# Patient Record
Sex: FEMALE | Race: WHITE
[De-identification: names, ages, dates, MRNs, and addresses within clinical notes are randomized per-mention and may not be internally consistent; named-entity substitution may affect disease eponyms.]

---

## 2021-01-19 ENCOUNTER — HOSPITAL ENCOUNTER (EMERGENCY)
Dept: HOSPITAL 62 - ER | Age: 61
Discharge: HOME | End: 2021-01-19
Payer: SELF-PAY

## 2021-01-19 VITALS — SYSTOLIC BLOOD PRESSURE: 135 MMHG | DIASTOLIC BLOOD PRESSURE: 92 MMHG

## 2021-01-19 DIAGNOSIS — F17.200: ICD-10-CM

## 2021-01-19 DIAGNOSIS — R07.81: ICD-10-CM

## 2021-01-19 DIAGNOSIS — R07.89: Primary | ICD-10-CM

## 2021-01-19 LAB
BARBITURATES UR QL SCN: NEGATIVE
METHADONE UR QL SCN: NEGATIVE
PCP UR QL SCN: NEGATIVE
URINE BENZODIAZEPINES SCREEN: NEGATIVE
URINE COCAINE SCREEN: NEGATIVE
URINE MARIJUANA (THC) SCREEN: NEGATIVE

## 2021-01-19 PROCEDURE — 99284 EMERGENCY DEPT VISIT MOD MDM: CPT

## 2021-01-19 PROCEDURE — 80307 DRUG TEST PRSMV CHEM ANLYZR: CPT

## 2021-01-19 NOTE — ER DOCUMENT REPORT
HPI





- HPI


Patient complains to provider of: rib pain


Time Seen by Provider: 21 11:24


Onset: Yesterday


Onset/Duration: Persistent


Quality of pain: Achy


Pain Level: 5


Context: 





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  

Patient states that she pulled this muscle again yesterday pulling on a door.  

Patient states she has been doing a lot of exertional physical activities at 

work that been aggravating this rib pain.  Patient denies any cough or cold 

symptoms.  Patient denies any fever.  Patient denies any nausea vomiting or 

diarrhea.


Associated Symptoms: Other - Left lower anterior rib pain.  denies: 

Nonproductive cough, Productive cough, Fever


Exacerbated by: Movement, Deep breathing


Relieved by: Remaining still


Similar symptoms previously: Yes


Recently seen / treated by doctor: No





- ROS


ROS below otherwise negative: Yes


Systems Reviewed and Negative: Yes All other systems reviewed and negative





- CONSTITUTIONAL


Constitutional: DENIES: Fever, Chills





- CARDIOVASCULAR


Cardiovascular: REPORTS: Chest pain - Left lower rib pain





- RESPIRATORY


Respiratory: DENIES: Trouble Breathing, Coughing





- GASTROINTESTINAL


Gastrointestinal: DENIES: Abdominal Pain, Nausea, Patient vomiting





- URINARY


Urinary: DENIES: Dysuria





- MUSCULOSKELETAL


Musculoskeletal: DENIES: Back Pain, Neck Pain





- DERM


Skin Color: Normal


Skin Problems: None





Past Medical History





- General


Information source: Patient





- Social History


Smoking Status: Current Every Day Smoker


Frequency of alcohol use: None


Drug Abuse: None, Other


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


Family History: Reviewed & Not Pertinent





- Medical History


Medical History: Other - Chronic pain to the ribs


Past Surgical History: Reports: Hx  Section





Vertical Provider Document





- CONSTITUTIONAL


Agree With Documented VS: Yes


Exam Limitations: No Limitations


General Appearance: WD/WN, No Apparent Distress





- HEENT


HEENT: Atraumatic, Normocephalic





- NECK


Neck: Normal Inspection, Supple





- RESPIRATORY


Respiratory: Breath Sounds Normal, No Respiratory Distress.  negative: Chest 

Non-Tender - Left lower anterior rib tenderness with palpation and movement of 

trunk





- CARDIOVASCULAR


Cardiovascular: Regular Rate, Regular Rhythm, No Murmur





- GI/ABDOMEN


Gastrointestinal: Abdomen Soft, Abdomen Non-Tender, No Organomegaly





- BACK


Back: Normal Inspection.  negative: CVA Tenderness-Right, CVA Tenderness-Left





- MUSCULOSKELETAL/EXTREMETIES


Musculoskeletal/Extremeties: MAEW, FROM





- NEURO


Level of Consciousness: Awake, Alert, Appropriate


Motor/Sensory: No Motor Deficit





- DERM


Integumentary: Warm, Dry, No Rash





Course





- Re-evaluation


Re-evalutation: 





21 14:30


Patient states that she has had chronic pain to the side of her rib cage off and

on over the past 23 years.  Patient states that she pulled a muscle on New 

Year's lifting turkeys and then pulled this area again yesterday lifting a heavy

door.  Patient feels that her symptoms are due to a muscle strain at this time 

and she refuses to have any lab work or EKG performed at this time.  Patient 

states she feels that she can manage her symptoms at home.





- Vital Signs


Vital signs: 


                                        











Temp Pulse Resp BP Pulse Ox


 


 99.5 F   111 H  22 H  146/81 H  97 


 


 21 11:14  21 11:14  21 11:14  21 11:14  21 11:14














- Laboratory Results


Critical Laboratory Results Reviewed: No Critical Results





- Radiology Results


Critical Radiology Results Reviewed: No Critical Results





Discharge





- Discharge


Clinical Impression: 


 Chest wall pain





Condition: Stable


Disposition: HOME, SELF-CARE


Instructions:  Anti-Inflammatory Medication ([facility]), Chest Wall Pain ([facility]), Muscle

Relaxers ([facility])


Additional Instructions: 


Return immediately for any new or worsening symptoms, or if you would like to 

have additional evaluation of your symptoms





Followup with your primary care provider, call tomorrow to make a followup 

appointment








Prescriptions: 


Cyclobenzaprine HCl [Flexeril 10 Mg Tablet] 10 mg PO TID #15 tablet


Lidocaine [Lidoderm 5% (700 mg) Transdermal Patch] 1 patch TP DAILY PRN #10 

adh..patch


 PRN Reason: 


Naproxen [Naprosyn 250 Nmg Tablet] 1 tab PO BID #14 tablet


Forms:  Return to Work


Referrals: 


[facility] [Provider Group] - Follow up as needed


[facility] [Provider Group] - Follow up as needed

## 2021-01-19 NOTE — RADIOLOGY REPORT (SQ)
EXAM DESCRIPTION:  RIBS LEFT W/PA CHEST



IMAGES COMPLETED DATE/TIME:  1/19/2021 12:07 pm



REASON FOR STUDY:  left rib pain, no trauma



COMPARISON:  None.



TECHNIQUE:  Frontal view of the chest and additional views of the left ribs acquired.



NUMBER OF VIEWS:  3 views



LIMITATIONS:  None.



FINDINGS:  FRONTAL CXR: No pneumothorax.  No pleural effusion.  No atelectasis or infiltrates.

RIBS: No displaced rib fractures.  No lytic or blastic bony lesions.

OTHER: No other significant finding.



IMPRESSION:  NO PNEUMOTHORAX.  NO DISPLACED RIB FRACTURES.



COMMENT:  SITE OF TRAUMA/COMPLAINT MARKED/STAMP COMPLETED: NO.



TECHNICAL DOCUMENTATION:  JOB ID:  [id number]

 2011 [employer]- All Rights Reserved



Reading location - IP/workstation name: [email or web address]

## 2021-01-19 NOTE — ER DOCUMENT REPORT
ED Medical Screen (RME)





- General


Stated Complaint: LEFT SIDE PAIN PAIN WHEN BREATHING


Time Seen by Provider: 01/19/21 11:24





- HPI


Notes: 





01/19/21 11:32


60-year-old female presents to emergency room today with left-sided rib pain, 

talking to herself, who is a poor historian and very agitated, after she states 

she is having "muscle spasms" after she pulled a heavy door yesterday.  States 

she took Tylenol without relief.  Patient states that she has a history of 

chronic pain, and she reinjured her left chest wall on New Year's Denice, unable to

tell me how.  Denies any chest pain, reports she does have shortness of breath 

with her pain.  Denies any nausea, vomiting, diarrhea.  Reports pain is "50/5". 

When asking patient questions about what happened , she stated "you're pissing 

me off" for see me off








I have greeted and performed a rapid initial assessment of this patient.  A 

comprehensive ED assessment and evaluation of the patient, analysis of test 

results and completion of the medical decision making process will be conducted 

by additional ED providers.





PHYSICAL EXAMINATION:





GENERAL: Chronically ill and malnourished and in moderate distress





HEAD: Atraumatic, normocephalic.





CV: s1, s2 regular 





LUNGS: No respiratory distress





Musculoskeletal: Normal range of motion.  Tenderness from I have ordered 

additional IV fluids for sepsis protocol, I have ordered antibiotics.  

Intercostal rib 2 7 costal rib from anterior chest to posterior chest.  No 

ecchymosis or step-off noted.  Patient would not allow me to palpate





NEUROLOGICAL: Patient talking to herself, agitated





SKIN: Warm, Dry, normal turgor, no rashes or lesions noted.





Unable to get a full examination while in triage due to patient's agitation as 

well as her pain.  Patient does need to be assessed in a bed due to her pain.  I

will obtain a urine drug screen due to patient's unusal behavior. 








The patient was evaluated during a global COVID-19 pandemic and that diagnosis 

was suspected/considered upon their initial presentation.  Their evaluation, 

treatment and testing was consistent with current guidelines for patients who 

present with complaints or symptoms and may be related to COVID-19.





01/19/21 11:33








- Related Data


Allergies/Adverse Reactions: 


                                        





No Known Allergies Allergy (Verified 01/19/21 11:16)


   











Past Medical History





- Social History


Drug Abuse: Other





Physical Exam





- Vital signs


Vitals: 





                                        











Temp Pulse Resp BP Pulse Ox


 


 99.5 F   111 H  22 H  146/81 H  97 


 


 01/19/21 11:14  01/19/21 11:14  01/19/21 11:14  01/19/21 11:14  01/19/21 11:14














Course





- Vital Signs


Vital signs: 





                                        











Temp Pulse Resp BP Pulse Ox


 


 99.5 F   111 H  22 H  146/81 H  97 


 


 01/19/21 11:14  01/19/21 11:14  01/19/21 11:14  01/19/21 11:14  01/19/21 11:14